# Patient Record
Sex: FEMALE | URBAN - METROPOLITAN AREA
[De-identification: names, ages, dates, MRNs, and addresses within clinical notes are randomized per-mention and may not be internally consistent; named-entity substitution may affect disease eponyms.]

---

## 2017-04-03 ENCOUNTER — IMPORTED ENCOUNTER (OUTPATIENT)
Dept: URBAN - METROPOLITAN AREA CLINIC 38 | Facility: CLINIC | Age: 32
End: 2017-04-03

## 2017-04-03 PROBLEM — H43.392 VITREOUS FLOATERS, OTHER VITREOUS OPACITIES,    LEFT EYE: Noted: 2017-04-03

## 2017-04-03 PROBLEM — H52.13 MYOPIA, BILATERAL: Noted: 2017-04-03

## 2019-09-09 ENCOUNTER — IMPORTED ENCOUNTER (OUTPATIENT)
Dept: URBAN - METROPOLITAN AREA CLINIC 38 | Facility: CLINIC | Age: 34
End: 2019-09-09

## 2019-09-09 PROBLEM — H52.13 MYOPIA, BILATERAL: Noted: 2019-09-09

## 2019-09-09 PROCEDURE — 92015 DETERMINE REFRACTIVE STATE: CPT

## 2021-07-15 PROBLEM — Z00.00 ENCOUNTER FOR PREVENTIVE HEALTH EXAMINATION: Status: ACTIVE | Noted: 2021-07-15

## 2021-07-30 ENCOUNTER — APPOINTMENT (OUTPATIENT)
Dept: PLASTIC SURGERY | Facility: CLINIC | Age: 36
End: 2021-07-30
Payer: COMMERCIAL

## 2021-07-30 VITALS
SYSTOLIC BLOOD PRESSURE: 117 MMHG | TEMPERATURE: 98 F | BODY MASS INDEX: 32.14 KG/M2 | HEART RATE: 84 BPM | DIASTOLIC BLOOD PRESSURE: 79 MMHG | WEIGHT: 200 LBS | HEIGHT: 66 IN

## 2021-07-30 DIAGNOSIS — Z87.891 PERSONAL HISTORY OF NICOTINE DEPENDENCE: ICD-10-CM

## 2021-07-30 DIAGNOSIS — Z80.7 FAMILY HISTORY OF OTHER MALIGNANT NEOPLASMS OF LYMPHOID, HEMATOPOIETIC AND RELATED TISSUES: ICD-10-CM

## 2021-07-30 DIAGNOSIS — Z78.9 OTHER SPECIFIED HEALTH STATUS: ICD-10-CM

## 2021-07-30 DIAGNOSIS — F32.9 MAJOR DEPRESSIVE DISORDER, SINGLE EPISODE, UNSPECIFIED: ICD-10-CM

## 2021-07-30 DIAGNOSIS — Z82.49 FAMILY HISTORY OF ISCHEMIC HEART DISEASE AND OTHER DISEASES OF THE CIRCULATORY SYSTEM: ICD-10-CM

## 2021-07-30 DIAGNOSIS — F41.9 ANXIETY DISORDER, UNSPECIFIED: ICD-10-CM

## 2021-07-30 PROCEDURE — 99204 OFFICE O/P NEW MOD 45 MIN: CPT

## 2021-08-01 PROBLEM — Z82.49 FAMILY HISTORY OF CARDIOMYOPATHY: Status: ACTIVE | Noted: 2021-07-30

## 2021-08-01 PROBLEM — F32.9 DEPRESSION: Status: ACTIVE | Noted: 2021-07-30

## 2021-08-01 PROBLEM — Z87.891 FORMER SMOKER: Status: ACTIVE | Noted: 2021-07-30

## 2021-08-01 PROBLEM — Z78.9 SOCIAL ALCOHOL USE: Status: ACTIVE | Noted: 2021-07-30

## 2021-08-01 PROBLEM — F41.9 ANXIETY: Status: ACTIVE | Noted: 2021-07-30

## 2021-08-01 PROBLEM — Z80.7 FAMILY HISTORY OF MULTIPLE MYELOMA: Status: ACTIVE | Noted: 2021-07-30

## 2021-08-01 PROBLEM — Z78.9 NON-SMOKER: Status: ACTIVE | Noted: 2021-07-30

## 2021-08-01 RX ORDER — FLUOXETINE HYDROCHLORIDE 40 MG/1
CAPSULE ORAL
Refills: 0 | Status: ACTIVE | COMMUNITY

## 2021-08-01 RX ORDER — TESTOSTERONE CYPIONATE 100 MG/ML
100 INJECTION, SOLUTION INTRAMUSCULAR
Refills: 0 | Status: ACTIVE | COMMUNITY

## 2021-08-01 RX ORDER — FLUTICASONE FUROATE AND VILANTEROL TRIFENATATE 50; 25 UG/1; UG/1
POWDER RESPIRATORY (INHALATION)
Refills: 0 | Status: ACTIVE | COMMUNITY

## 2021-08-01 RX ORDER — OMEPRAZOLE 40 MG/1
40 CAPSULE, DELAYED RELEASE ORAL
Refills: 0 | Status: ACTIVE | COMMUNITY

## 2021-08-01 RX ORDER — ALPRAZOLAM 0.25 MG/1
0.25 TABLET ORAL
Refills: 0 | Status: ACTIVE | COMMUNITY

## 2021-08-01 RX ORDER — EMTRICITABINE AND TENOFOVIR DISOPROXIL FUMARATE 200; 300 MG/1; MG/1
200-300 TABLET, FILM COATED ORAL
Refills: 0 | Status: ACTIVE | COMMUNITY

## 2021-08-31 NOTE — ADDENDUM
[FreeTextEntry1] : 2021-08-31\par \par Pt has letters of assessment from [facility] OSEI Mccarty and Corinne Vidulich, MD. Will place surgical orders ([employer]).

## 2021-08-31 NOTE — PHYSICAL EXAM
[de-identified] : NAD.  BMI 32.3. [de-identified] : Normal respiratory effort [de-identified] : No dominant masses, skin changes, nipple retraction, or palpable axillary lymphadenopathy. SN->N distance is 31.5 cm on the right and 30.5 cm on the left; width is 16.5 cm on the right and 15.5 cm on the left; N->IMF is 12.5 cm on the right and 12.5 cm on the left. There is grade 3 ptosis.

## 2021-08-31 NOTE — ASSESSMENT
[FreeTextEntry1] : The patient is a good candidate for [facility] top surgery.  In order to submit to insurance, we will need a letter of assessment from his mental health provider.

## 2021-08-31 NOTE — HISTORY OF PRESENT ILLNESS
[FreeTextEntry1] : 35-year-old man designated female birth (Pankaj; he/him) presents for consultation for top surgery.  He expresses desire "to be flat."  He has been using testosterone for 2.5 years and binding for 4 years.  He denies any recent changes in the breasts.  He had an ultrasound of the breast many years ago for benign finding.  There is a family history of breast cancer in his maternal aunt, but none in his mother.  He states that nipple sensation is not at all important to him (1 out of 5 importance).  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  He lives with his 2 children.  He states he quit smoking in 2018.  He drinks some alcohol.  He denies recreational drug use.  He is on Truvada for PrEP.  His last asthma attack was in 2018.

## 2021-08-31 NOTE — REVIEW OF SYSTEMS
[Eyesight Problems] : eyesight problems [Negative] : Heme/Lymph [FreeTextEntry3] : Corrective lenses [FreeTextEntry6] : Asthma.

## 2021-10-11 ENCOUNTER — APPOINTMENT (OUTPATIENT)
Dept: PLASTIC SURGERY | Facility: HOSPITAL | Age: 36
End: 2021-10-11

## 2022-03-07 ENCOUNTER — OUTPATIENT (OUTPATIENT)
Dept: OUTPATIENT SERVICES | Facility: HOSPITAL | Age: 37
LOS: 1 days | End: 2022-03-07
Payer: COMMERCIAL

## 2022-03-07 VITALS
HEART RATE: 70 BPM | WEIGHT: 177.03 LBS | OXYGEN SATURATION: 97 % | DIASTOLIC BLOOD PRESSURE: 83 MMHG | HEIGHT: 67 IN | TEMPERATURE: 99 F | RESPIRATION RATE: 18 BRPM | SYSTOLIC BLOOD PRESSURE: 124 MMHG

## 2022-03-07 DIAGNOSIS — Z92.89 PERSONAL HISTORY OF OTHER MEDICAL TREATMENT: Chronic | ICD-10-CM

## 2022-03-07 DIAGNOSIS — J45.909 UNSPECIFIED ASTHMA, UNCOMPLICATED: ICD-10-CM

## 2022-03-07 DIAGNOSIS — F64.0 TRANSSEXUALISM: ICD-10-CM

## 2022-03-07 DIAGNOSIS — Z01.818 ENCOUNTER FOR OTHER PREPROCEDURAL EXAMINATION: ICD-10-CM

## 2022-03-07 DIAGNOSIS — Z98.890 OTHER SPECIFIED POSTPROCEDURAL STATES: Chronic | ICD-10-CM

## 2022-03-07 DIAGNOSIS — Z90.89 ACQUIRED ABSENCE OF OTHER ORGANS: Chronic | ICD-10-CM

## 2022-03-07 DIAGNOSIS — Z98.891 HISTORY OF UTERINE SCAR FROM PREVIOUS SURGERY: Chronic | ICD-10-CM

## 2022-03-07 LAB
ANION GAP SERPL CALC-SCNC: 11 MMOL/L — SIGNIFICANT CHANGE UP (ref 5–17)
BUN SERPL-MCNC: 14 MG/DL — SIGNIFICANT CHANGE UP (ref 7–23)
CALCIUM SERPL-MCNC: 9.8 MG/DL — SIGNIFICANT CHANGE UP (ref 8.4–10.5)
CHLORIDE SERPL-SCNC: 104 MMOL/L — SIGNIFICANT CHANGE UP (ref 96–108)
CO2 SERPL-SCNC: 27 MMOL/L — SIGNIFICANT CHANGE UP (ref 22–31)
CREAT SERPL-MCNC: 0.9 MG/DL — SIGNIFICANT CHANGE UP (ref 0.5–1.3)
EGFR: 86 ML/MIN/1.73M2 — SIGNIFICANT CHANGE UP
GLUCOSE SERPL-MCNC: 81 MG/DL — SIGNIFICANT CHANGE UP (ref 70–99)
HCT VFR BLD CALC: 47.1 % — HIGH (ref 34.5–45)
HGB BLD-MCNC: 15.1 G/DL — SIGNIFICANT CHANGE UP (ref 11.5–15.5)
MCHC RBC-ENTMCNC: 27.7 PG — SIGNIFICANT CHANGE UP (ref 27–34)
MCHC RBC-ENTMCNC: 32.1 GM/DL — SIGNIFICANT CHANGE UP (ref 32–36)
MCV RBC AUTO: 86.4 FL — SIGNIFICANT CHANGE UP (ref 80–100)
NRBC # BLD: 0 /100 WBCS — SIGNIFICANT CHANGE UP (ref 0–0)
PLATELET # BLD AUTO: 296 K/UL — SIGNIFICANT CHANGE UP (ref 150–400)
POTASSIUM SERPL-MCNC: 4.5 MMOL/L — SIGNIFICANT CHANGE UP (ref 3.5–5.3)
POTASSIUM SERPL-SCNC: 4.5 MMOL/L — SIGNIFICANT CHANGE UP (ref 3.5–5.3)
RBC # BLD: 5.45 M/UL — HIGH (ref 3.8–5.2)
RBC # FLD: 14.4 % — SIGNIFICANT CHANGE UP (ref 10.3–14.5)
SODIUM SERPL-SCNC: 142 MMOL/L — SIGNIFICANT CHANGE UP (ref 135–145)
WBC # BLD: 6.78 K/UL — SIGNIFICANT CHANGE UP (ref 3.8–10.5)
WBC # FLD AUTO: 6.78 K/UL — SIGNIFICANT CHANGE UP (ref 3.8–10.5)

## 2022-03-07 PROCEDURE — 80048 BASIC METABOLIC PNL TOTAL CA: CPT

## 2022-03-07 PROCEDURE — 85027 COMPLETE CBC AUTOMATED: CPT

## 2022-03-07 PROCEDURE — G0463: CPT

## 2022-03-07 RX ORDER — VANCOMYCIN HCL 1 G
1000 VIAL (EA) INTRAVENOUS ONCE
Refills: 0 | Status: COMPLETED | OUTPATIENT
Start: 2022-03-14 | End: 2022-03-14

## 2022-03-07 RX ORDER — CHLORHEXIDINE GLUCONATE 213 G/1000ML
1 SOLUTION TOPICAL ONCE
Refills: 0 | Status: COMPLETED | OUTPATIENT
Start: 2022-03-14 | End: 2022-03-14

## 2022-03-07 RX ORDER — APREPITANT 80 MG/1
40 CAPSULE ORAL ONCE
Refills: 0 | Status: COMPLETED | OUTPATIENT
Start: 2022-03-14 | End: 2022-03-14

## 2022-03-07 RX ORDER — SODIUM CHLORIDE 9 MG/ML
1000 INJECTION, SOLUTION INTRAVENOUS
Refills: 0 | Status: DISCONTINUED | OUTPATIENT
Start: 2022-03-14 | End: 2022-03-28

## 2022-03-07 RX ORDER — SODIUM CHLORIDE 9 MG/ML
3 INJECTION INTRAMUSCULAR; INTRAVENOUS; SUBCUTANEOUS EVERY 8 HOURS
Refills: 0 | Status: DISCONTINUED | OUTPATIENT
Start: 2022-03-14 | End: 2022-03-28

## 2022-03-07 RX ORDER — LIDOCAINE HCL 20 MG/ML
0.2 VIAL (ML) INJECTION ONCE
Refills: 0 | Status: COMPLETED | OUTPATIENT
Start: 2022-03-14 | End: 2022-03-14

## 2022-03-07 NOTE — H&P PST ADULT - HISTORY OF PRESENT ILLNESS
hosptialized for asthma 10/2018 - not intubated - x 5 days in [facility]. asthma attack.  Feb 1 2022 - Tested positive for Covid19; stuffy nose, fever x 1 day. 37 yo presents to [facility] prior to schedueld bilateral subtotal subcutaneous mastectomy with free nipple grafting on 3/14/22 with Dr. Irving Calhoun. Pmhx includes asthma (hospitalized in 10/28 with acute exacerbation x 5 days; never intubated; with rare inhaler use, on Breo Ellipta daily), Covid19 infection (positive in 2/1/22 - s/s stuff nose and fever x 1 day), MVP, atrial septal aneurysm (echo in summer of 2021), GERD, anxiety/depression. Procedure is elective. Denies skin changes in breasts, palpable masses, nipple drainage/inversion.   Of note, recently treated for sinus infection w/ one week course of antibiotics (last dose 3/4/22); denies current symptoms.  Covid19 test deferred d/t recent positive test, results in chart.    Vaccinated for Covid19 with booster.

## 2022-03-07 NOTE — H&P PST ADULT - PROBLEM SELECTOR PLAN 1
Subtotal subcutaneous mastectomy with free nipple grafting on 3/14/22 with Dr. Irving Calhoun.  Pre-op instructions given. Questions answered.  Covid19 PCR deferred.

## 2022-03-07 NOTE — H&P PST ADULT - NSICDXPASTSURGICALHX_GEN_ALL_CORE_FT
PAST SURGICAL HISTORY:  H/O  section 3/31/2015    H/O endoscopy     History of dental surgery 2010    History of tonsillectomy approximately age 9

## 2022-03-07 NOTE — H&P PST ADULT - NSICDXPASTMEDICALHX_GEN_ALL_CORE_FT
PAST MEDICAL HISTORY:  2019 novel coronavirus disease (COVID-19)     Anxiety and depression     Asthma     Atrial septal aneurysm     COVID-19 vaccine series completed     GERD (gastroesophageal reflux disease)     H/O mitral valve prolapse

## 2022-03-07 NOTE — H&P PST ADULT - NSICDXFAMILYHX_GEN_ALL_CORE_FT
FAMILY HISTORY:  Father  Still living? Unknown  FH: multiple myeloma, Age at diagnosis: Age Unknown    Mother  Still living? Unknown  Family history of cardiomyopathy, Age at diagnosis: Age Unknown  Family history of TIAs, Age at diagnosis: Age Unknown    Grandparent  Still living? Unknown  FH: COPD (chronic obstructive pulmonary disease), Age at diagnosis: Age Unknown  FH: lung cancer, Age at diagnosis: Age Unknown  FH: stroke, Age at diagnosis: Age Unknown  FH: stroke, Age at diagnosis: Age Unknown     (0) content, relaxed/(0) no cry (awake or asleep)/(0) normal position or relaxed/(0) lying quietly, normal position, moves easily/(0) no particular expression or smile

## 2022-03-07 NOTE — H&P PST ADULT - RS GEN PE MLT RESP DETAILS PC
PAST SURGICAL HISTORY:  S/P CABG x 2     S/P small bowel resection with subsequent lysis of adhesisions    
airway patent/breath sounds equal/good air movement/respirations non-labored/clear to auscultation bilaterally

## 2022-03-13 RX ORDER — ONDANSETRON 8 MG/1
4 TABLET, FILM COATED ORAL ONCE
Refills: 0 | Status: DISCONTINUED | OUTPATIENT
Start: 2022-03-14 | End: 2022-03-28

## 2022-03-13 RX ORDER — OXYCODONE HYDROCHLORIDE 5 MG/1
5 TABLET ORAL ONCE
Refills: 0 | Status: DISCONTINUED | OUTPATIENT
Start: 2022-03-14 | End: 2022-03-14

## 2022-03-13 RX ORDER — HYDROMORPHONE HYDROCHLORIDE 2 MG/ML
0.25 INJECTION INTRAMUSCULAR; INTRAVENOUS; SUBCUTANEOUS
Refills: 0 | Status: DISCONTINUED | OUTPATIENT
Start: 2022-03-14 | End: 2022-03-14

## 2022-03-14 ENCOUNTER — OUTPATIENT (OUTPATIENT)
Dept: OUTPATIENT SERVICES | Facility: HOSPITAL | Age: 37
LOS: 1 days | Discharge: HOME | End: 2022-03-14
Payer: COMMERCIAL

## 2022-03-14 ENCOUNTER — APPOINTMENT (OUTPATIENT)
Dept: PLASTIC SURGERY | Facility: HOSPITAL | Age: 37
End: 2022-03-14

## 2022-03-14 VITALS
RESPIRATION RATE: 18 BRPM | HEART RATE: 106 BPM | SYSTOLIC BLOOD PRESSURE: 122 MMHG | OXYGEN SATURATION: 99 % | DIASTOLIC BLOOD PRESSURE: 65 MMHG | TEMPERATURE: 99 F

## 2022-03-14 VITALS
SYSTOLIC BLOOD PRESSURE: 136 MMHG | HEART RATE: 92 BPM | TEMPERATURE: 98 F | WEIGHT: 177.03 LBS | DIASTOLIC BLOOD PRESSURE: 81 MMHG | HEIGHT: 67 IN | RESPIRATION RATE: 16 BRPM | OXYGEN SATURATION: 99 %

## 2022-03-14 DIAGNOSIS — F64.0 TRANSSEXUALISM: ICD-10-CM

## 2022-03-14 DIAGNOSIS — Z98.891 HISTORY OF UTERINE SCAR FROM PREVIOUS SURGERY: Chronic | ICD-10-CM

## 2022-03-14 DIAGNOSIS — Z98.890 OTHER SPECIFIED POSTPROCEDURAL STATES: Chronic | ICD-10-CM

## 2022-03-14 DIAGNOSIS — Z92.89 PERSONAL HISTORY OF OTHER MEDICAL TREATMENT: Chronic | ICD-10-CM

## 2022-03-14 DIAGNOSIS — Z90.89 ACQUIRED ABSENCE OF OTHER ORGANS: Chronic | ICD-10-CM

## 2022-03-14 PROCEDURE — C9399: CPT

## 2022-03-14 PROCEDURE — 88307 TISSUE EXAM BY PATHOLOGIST: CPT | Mod: 26

## 2022-03-14 PROCEDURE — 19318 BREAST REDUCTION: CPT | Mod: 50

## 2022-03-14 PROCEDURE — 88307 TISSUE EXAM BY PATHOLOGIST: CPT

## 2022-03-14 PROCEDURE — C1889: CPT

## 2022-03-14 DEVICE — CLIP APPLIER COVIDIEN SURGICLIP 11.5" MEDIUM: Type: IMPLANTABLE DEVICE | Status: FUNCTIONAL

## 2022-03-14 RX ORDER — OXYCODONE HYDROCHLORIDE 5 MG/1
1 TABLET ORAL
Qty: 10 | Refills: 0
Start: 2022-03-14

## 2022-03-14 RX ADMIN — SODIUM CHLORIDE 100 MILLILITER(S): 9 INJECTION, SOLUTION INTRAVENOUS at 10:11

## 2022-03-14 RX ADMIN — APREPITANT 40 MILLIGRAM(S): 80 CAPSULE ORAL at 10:10

## 2022-03-14 RX ADMIN — CHLORHEXIDINE GLUCONATE 1 APPLICATION(S): 213 SOLUTION TOPICAL at 10:11

## 2022-03-14 RX ADMIN — SODIUM CHLORIDE 3 MILLILITER(S): 9 INJECTION INTRAMUSCULAR; INTRAVENOUS; SUBCUTANEOUS at 10:12

## 2022-03-14 NOTE — PRE-ANESTHESIA EVALUATION ADULT - NSANTHPMHFT_GEN_ALL_CORE
Last exacerbation of asthma in 8/21; GERD (HAS to take pills every day); atrial aneurysm is still (good stress test; walks miles/day)

## 2022-03-14 NOTE — ASU DISCHARGE PLAN (ADULT/PEDIATRIC) - NURSING INSTRUCTIONS
You received a dose of Tylenol today at 12:25 PM this afternoon. If needed, next dose time is 6:25 PM this evening. Do not exceed 4,000 mg of Tylenol in a 24 hour period.

## 2022-03-14 NOTE — ASU DISCHARGE PLAN (ADULT/PEDIATRIC) - NS MD DC FALL RISK RISK
For information on Fall & Injury Prevention, visit: [email or web address] OR  [email or web address] OR  https://www.cdc.gov/steadi/patient.html

## 2022-03-14 NOTE — ASU DISCHARGE PLAN (ADULT/PEDIATRIC) - CARE PROVIDER_API CALL
Irving Calhoun)  Surgery  PlasticReconstruct  [address], [address]  [place] [address]  Phone: [phone]  Fax: [phone]  Follow Up Time: 1 week

## 2022-03-15 RX ORDER — ENOXAPARIN SODIUM 100 MG/ML
40 INJECTION SUBCUTANEOUS
Qty: 2.8 | Refills: 0
Start: 2022-03-15 | End: 2022-03-21

## 2022-03-17 ENCOUNTER — APPOINTMENT (OUTPATIENT)
Dept: PLASTIC SURGERY | Facility: CLINIC | Age: 37
End: 2022-03-17
Payer: COMMERCIAL

## 2022-03-17 PROCEDURE — 99024 POSTOP FOLLOW-UP VISIT: CPT

## 2022-03-17 NOTE — ASSESSMENT
[FreeTextEntry1] : No concern for clinically significant hematoma at this time.  Continue compression with Ace wraps and activity restrictions.  Follow-up as planned next Wednesday.

## 2022-03-17 NOTE — HISTORY OF PRESENT ILLNESS
[FreeTextEntry1] : The patient returns 3 days following bilateral subtotal subcutaneous mastectomy with free nipple grafting.  He was concerned about some slight bruising in the left axilla and difficulty maintaining suction with the right drain.  He denies significant discomfort.

## 2022-03-17 NOTE — REASON FOR VISIT
[Post Op: _________] : a [unfilled] post op visit [FreeTextEntry1] : Dop - 3/15/22 S/P - Bilateral subtotal subcutaneous mastectomy with free nipple grafting.

## 2022-03-17 NOTE — PHYSICAL EXAM
[de-identified] : Subtle ecchymoses in left anterior axilla without significant fullness.  The chest has a smooth contour.  The right drain dressing was reinforced with an additional Tegaderm and suction was noted to be intact.

## 2022-03-23 ENCOUNTER — APPOINTMENT (OUTPATIENT)
Dept: PLASTIC SURGERY | Facility: CLINIC | Age: 37
End: 2022-03-23
Payer: COMMERCIAL

## 2022-03-23 PROCEDURE — 99024 POSTOP FOLLOW-UP VISIT: CPT

## 2022-03-23 NOTE — ASSESSMENT
[FreeTextEntry1] : No evidence of infection or collection.  Wound care, activity restrictions, and need for compression reviewed.  Follow-up in 3 weeks for reassessment.

## 2022-03-23 NOTE — HISTORY OF PRESENT ILLNESS
[FreeTextEntry1] : No significant issues.  Denies significant pain.  Drain output has been less than 20 cc a day on each side

## 2022-03-23 NOTE — PHYSICAL EXAM
[de-identified] : Dressings, drains, and bolsters removed completely.  NACs adherent.  No areas of fullness, fluctuance, erythema, or significant ecchymoses.

## 2022-03-24 LAB — SURGICAL PATHOLOGY STUDY: SIGNIFICANT CHANGE UP

## 2022-04-13 ENCOUNTER — APPOINTMENT (OUTPATIENT)
Dept: PLASTIC SURGERY | Facility: CLINIC | Age: 37
End: 2022-04-13
Payer: COMMERCIAL

## 2022-04-13 PROCEDURE — 99024 POSTOP FOLLOW-UP VISIT: CPT

## 2022-04-13 NOTE — ASSESSMENT
[FreeTextEntry1] : Healing well.  Wound care, activity restrictions, need for compression reviewed.  Start massage of the drain sites bilaterally.  Follow-up in 2-3 weeks for reassessment.

## 2022-04-13 NOTE — PHYSICAL EXAM
[de-identified] : Good symmetry.  NACs healing appropriately.  Partial nipple loss on left.  No areas of fullness or fluctuance.  Lateral incisions with tethering of the drain site to the underlying chest wall.

## 2022-04-13 NOTE — HISTORY OF PRESENT ILLNESS
[FreeTextEntry1] : Patient denies any significant issues.  He is here for his 4-week follow-up visit.

## 2022-04-19 RX ORDER — ZINC SULFATE TAB 220 MG (50 MG ZINC EQUIVALENT) 220 (50 ZN) MG
1 TAB ORAL
Qty: 0 | Refills: 0 | DISCHARGE

## 2022-04-19 RX ORDER — CHOLECALCIFEROL (VITAMIN D3) 125 MCG
1 CAPSULE ORAL
Qty: 0 | Refills: 0 | DISCHARGE

## 2022-04-19 RX ORDER — FLUOXETINE HCL 10 MG
1 CAPSULE ORAL
Qty: 0 | Refills: 0 | DISCHARGE

## 2022-04-19 RX ORDER — OMEPRAZOLE 10 MG/1
1 CAPSULE, DELAYED RELEASE ORAL
Qty: 0 | Refills: 0 | DISCHARGE

## 2022-04-19 RX ORDER — ALBUTEROL 90 UG/1
2 AEROSOL, METERED ORAL
Qty: 0 | Refills: 0 | DISCHARGE

## 2022-04-19 RX ORDER — ALPRAZOLAM 0.25 MG
1 TABLET ORAL
Qty: 0 | Refills: 0 | DISCHARGE

## 2022-04-22 ENCOUNTER — NON-APPOINTMENT (OUTPATIENT)
Age: 37
End: 2022-04-22

## 2022-04-27 ENCOUNTER — TRANSCRIPTION ENCOUNTER (OUTPATIENT)
Age: 37
End: 2022-04-27

## 2022-04-27 ENCOUNTER — APPOINTMENT (OUTPATIENT)
Dept: PLASTIC SURGERY | Facility: CLINIC | Age: 37
End: 2022-04-27
Payer: COMMERCIAL

## 2022-04-27 DIAGNOSIS — F64.0 TRANSSEXUALISM: ICD-10-CM

## 2022-04-27 PROBLEM — Z92.29 PERSONAL HISTORY OF OTHER DRUG THERAPY: Chronic | Status: ACTIVE | Noted: 2022-03-07

## 2022-04-27 PROBLEM — U07.1 COVID-19: Chronic | Status: ACTIVE | Noted: 2022-03-07

## 2022-04-27 PROBLEM — K21.9 GASTRO-ESOPHAGEAL REFLUX DISEASE WITHOUT ESOPHAGITIS: Chronic | Status: ACTIVE | Noted: 2022-03-07

## 2022-04-27 PROBLEM — Z86.79 PERSONAL HISTORY OF OTHER DISEASES OF THE CIRCULATORY SYSTEM: Chronic | Status: ACTIVE | Noted: 2022-03-07

## 2022-04-27 PROBLEM — I25.3 ANEURYSM OF HEART: Chronic | Status: ACTIVE | Noted: 2022-03-07

## 2022-04-27 PROBLEM — J45.909 UNSPECIFIED ASTHMA, UNCOMPLICATED: Chronic | Status: ACTIVE | Noted: 2022-03-07

## 2022-04-27 PROBLEM — F41.9 ANXIETY DISORDER, UNSPECIFIED: Chronic | Status: ACTIVE | Noted: 2022-03-07

## 2022-04-27 PROCEDURE — 99024 POSTOP FOLLOW-UP VISIT: CPT

## 2022-04-28 PROBLEM — F64.0 GENDER DYSPHORIA IN ADOLESCENT AND ADULT: Status: ACTIVE | Noted: 2021-08-01

## 2022-04-28 NOTE — ASSESSMENT
[FreeTextEntry1] : No evidence of infection or collection.  Recommend vigorous massage of lateral aspects of incisions to release drain site from underlying chest wall.  Recommend return in 6 weeks for reassessment.

## 2022-04-28 NOTE — PHYSICAL EXAM
[de-identified] : Good symmetry.  NACs well-healed.  Bilateral drain sites with tethering to underlying chest wall.

## 2022-04-28 NOTE — HISTORY OF PRESENT ILLNESS
[FreeTextEntry1] : The patient is now approximately 6 weeks status post top surgery.  He has no complaints.

## 2022-05-16 ENCOUNTER — IMPORTED ENCOUNTER (OUTPATIENT)
Dept: URBAN - METROPOLITAN AREA CLINIC 38 | Facility: CLINIC | Age: 37
End: 2022-05-16

## 2022-05-16 PROBLEM — H52.13 MYOPIA, BILATERAL: Noted: 2022-05-16

## 2022-05-16 PROCEDURE — 92015 DETERMINE REFRACTIVE STATE: CPT

## 2022-06-22 ENCOUNTER — APPOINTMENT (OUTPATIENT)
Dept: PLASTIC SURGERY | Facility: CLINIC | Age: 37
End: 2022-06-22

## 2022-07-02 ASSESSMENT — VISUAL ACUITY
OS_CC: 20/20-1
OD_CC: 20/20
OD_CC: J1
OS_CC: J1
OS_CC: J1
OD_CC: 20/20
OS_CC: 20/20-1
OD_CC: 20/20
OD_CC: J1
OS_CC: 20/25

## 2022-07-02 ASSESSMENT — KERATOMETRY
OD_AXISANGLE_DEGREES: 16
OS_K2POWER_DIOPTERS: 44.25
OD_K1POWER_DIOPTERS: 45.25
OS_AXISANGLE2_DEGREES: 35
OD_K1POWER_DIOPTERS: 45.00
OS_AXISANGLE2_DEGREES: 126
OD_K2POWER_DIOPTERS: 44.25
OS_AXISANGLE_DEGREES: 36
OS_K2POWER_DIOPTERS: 44.75
OD_AXISANGLE2_DEGREES: 103
OS_K1POWER_DIOPTERS: 45.00
OD_K1POWER_DIOPTERS: 43.75
OS_K2POWER_DIOPTERS: 44.75
OD_AXISANGLE2_DEGREES: 12
OD_K2POWER_DIOPTERS: 44.50
OS_K1POWER_DIOPTERS: 44.75
OD_K2POWER_DIOPTERS: 44.75
OD_AXISANGLE_DEGREES: 13
OS_AXISANGLE_DEGREES: 28
OS_AXISANGLE2_DEGREES: 118
OS_AXISANGLE_DEGREES: 125
OD_AXISANGLE_DEGREES: 102
OD_AXISANGLE2_DEGREES: 106
OS_K1POWER_DIOPTERS: 44.50

## 2022-07-02 ASSESSMENT — TONOMETRY
OS_IOP_MMHG: 16
OS_IOP_MMHG: 11
OD_IOP_MMHG: 14
OD_IOP_MMHG: 12
OD_IOP_MMHG: 11
OS_IOP_MMHG: 12

## (undated) DEVICE — STAPLER SKIN VISI-STAT 35 WIDE

## (undated) DEVICE — SUT MONOSOF 4-0 18" C-13

## (undated) DEVICE — DRAPE INSTRUMENT POUCH 6.75" X 11"

## (undated) DEVICE — GLV 7.5 PROTEXIS (WHITE)

## (undated) DEVICE — SOL IRR POUR H2O 250ML

## (undated) DEVICE — PACK BREAST RECONSTRUCTION

## (undated) DEVICE — ELCTR BOVIE TIP BLADE INSULATED 2.75" EDGE

## (undated) DEVICE — SUT CHROMIC GUT 4-0 18" P-13

## (undated) DEVICE — DRSG BIOPATCH DISK W CHG 1" W 7.0MM HOLE

## (undated) DEVICE — DRAIN BLAKE 15FR BARD CHANNEL

## (undated) DEVICE — BASIN AMBULATORY

## (undated) DEVICE — SUT SOFSILK 2-0 30" V-20

## (undated) DEVICE — DRAPE 1/2 SHEET 40X57"

## (undated) DEVICE — SUT PLAIN GUT FAST ABSORBING 5-0 PC-1

## (undated) DEVICE — WARMING BLANKET LOWER ADULT

## (undated) DEVICE — SUT POLYSORB 2-0 30" GS-22 UNDYED

## (undated) DEVICE — BLADE SCALPEL SAFETYLOCK #11

## (undated) DEVICE — DRSG TEGADERM 2.5X3"

## (undated) DEVICE — DRSG XEROFORM 5 X 9"

## (undated) DEVICE — BLADE SCALPEL SAFETYLOCK #15

## (undated) DEVICE — MEDICATION LABELS W MARKER

## (undated) DEVICE — DRSG ACE BANDAGE 6"

## (undated) DEVICE — GLV 8 PROTEXIS (BLUE)

## (undated) DEVICE — SPECIMEN CONTAINER 100ML

## (undated) DEVICE — DRSG ACE BANDAGE 4" NS

## (undated) DEVICE — PREP CHLORAPREP HI-LITE ORANGE 26ML

## (undated) DEVICE — DRSG DERMABOND PRINEO 60CM

## (undated) DEVICE — SUT QUILL MONODERM 3-0 30CM 26MM

## (undated) DEVICE — GOWN TRIMAX LG

## (undated) DEVICE — FOLEY TRAY 16FR 5CC LTX UMETER CLOSED

## (undated) DEVICE — DRSG STERISTRIPS 0.5 X 4"

## (undated) DEVICE — SOL IRR POUR NS 0.9% 500ML

## (undated) DEVICE — VENODYNE/SCD SLEEVE CALF LARGE

## (undated) DEVICE — SUT MONOSOF 3-0 18" C-14

## (undated) DEVICE — DRSG KLING 6"

## (undated) DEVICE — ELCTR BOVIE TIP BLADE INSULATED 6.5" EDGE

## (undated) DEVICE — LAP PAD 18 X 18"

## (undated) DEVICE — SUT SOFSILK 2-0 30" TIES

## (undated) DEVICE — DRAPE IOBAN 23" X 23"

## (undated) DEVICE — MARKING PEN W RULER

## (undated) DEVICE — DRAIN RESERVOIR FOR JACKSON PRATT 100CC CARDINAL

## (undated) DEVICE — ONETRAC LIGHTED RETRACTOR 90 X 22MM DISP

## (undated) DEVICE — ONETRAC LIGHTED RETRACTOR 40 X 20MM DISP

## (undated) DEVICE — DRSG TELFA 3 X 8

## (undated) DEVICE — POSITIONER FOAM EGG CRATE ULNAR 2PCS (PINK)

## (undated) DEVICE — BLADE SCALPEL SAFETYLOCK #10

## (undated) DEVICE — DRSG COMBINE 5X9"

## (undated) DEVICE — ELCTR BOVIE PENCIL SMOKE EVACUATION

## (undated) DEVICE — COTTONBALL LG

## (undated) DEVICE — DRAPE TOWEL BLUE 17" X 24"

## (undated) DEVICE — ONETRAC LIGHTED RETRACTOR 135 X 30MM DISP

## (undated) DEVICE — SUT POLYSORB 3-0 18" P-12 UNDYED